# Patient Record
Sex: MALE | Race: ASIAN | NOT HISPANIC OR LATINO | URBAN - METROPOLITAN AREA
[De-identification: names, ages, dates, MRNs, and addresses within clinical notes are randomized per-mention and may not be internally consistent; named-entity substitution may affect disease eponyms.]

---

## 2018-05-08 ENCOUNTER — OFFICE (OUTPATIENT)
Dept: URBAN - METROPOLITAN AREA CLINIC 33 | Facility: CLINIC | Age: 38
End: 2018-05-08

## 2018-05-08 VITALS
TEMPERATURE: 97.7 F | DIASTOLIC BLOOD PRESSURE: 80 MMHG | SYSTOLIC BLOOD PRESSURE: 117 MMHG | WEIGHT: 181 LBS | HEIGHT: 70 IN | HEART RATE: 80 BPM

## 2018-05-08 DIAGNOSIS — R10.33 PERIUMBILICAL PAIN: ICD-10-CM

## 2018-05-08 DIAGNOSIS — K80.80 OTHER CHOLELITHIASIS WITHOUT OBSTRUCTION: ICD-10-CM

## 2018-05-08 PROCEDURE — 99205 OFFICE O/P NEW HI 60 MIN: CPT

## 2018-05-08 NOTE — INTERFACERESULTNOTES
liver enzymes still elevated (he was to have waited longer to get this lab test done). Awaiting HIDA scan results - please obtain results as soon as they are available. If his scan is scheduled for next week, have him repeat another hepatic function panel again then. Thanks

## 2018-05-08 NOTE — SERVICEHPINOTES
37 yo male (originally from [facility]) presents with recent issues of abdominal pain. He had symptoms starting in late April, shortly after eating a meal in [facility] where he works as an . He first developed nausea and felt feverish. Then developed mid-abdominal pain and some loose stools (one time). No blood in stools. He was seen at medical center there where CT suggested some inflammatory findings near pancreas and some GB wall thickening. Liver enzymes were mildly elevated (ALT in 80s) and white count was elevated. Lipase was normal and he denies ETOH consumption or cigarettes smoking or any h/o pancreatitis. He was given Augmentin and Nexium. He came back [place] the [place] and was still having some N/V and fluctuating levels of abdominal pain. Went to ER on 5/3 where U/S showed gallstones and thickening of GB wall but no overt inflammatory findings, though CT was suspicious for cholecystitis. Liver enzymes were also elevated further. Augmentin was DC'd (he had taken 3 doses) and he was given Flagyl, Cipro, Bentyl, and Zofran. An acute hepatitis panel was negative.Over the past week he has been feeling better. He can still have a little abdominal pain to right of umbilicus but denies pain with eating, N/V. His BMs have been normal.  He is needing to return to work again in [facility] as soon as he is cleared to go. He denies any h/o liver disease, jaundice. Notes that no one else got sick when/where he did, though he was told [place] medical providers at [place] facility that food-borne infections are fairly common. 5/3/18 WBC 9.3, AST//204, bili 1.1, alk phos 226, INR 1.0, Hep A IgM neg, HBcAb IgM neg, HBsAg neg, Hep C ab neg

## 2018-05-09 LAB
BASIC METABOLIC PANEL: BUN/CREATININE RATIO: (no result) (CALC)
BASIC METABOLIC PANEL: CALCIUM: 9.5 MG/DL (ref 8.6–10.3)
BASIC METABOLIC PANEL: CARBON DIOXIDE: 26 MMOL/L (ref 20–31)
BASIC METABOLIC PANEL: CHLORIDE: 103 MMOL/L (ref 98–110)
BASIC METABOLIC PANEL: CREATININE: 0.99 MG/DL (ref 0.6–1.35)
BASIC METABOLIC PANEL: EGFR AFRICAN AMERICAN: 110 ML/MIN/1.73M2 (ref 60–?)
BASIC METABOLIC PANEL: EGFR NON-AFR. AMERICAN: 95 ML/MIN/1.73M2 (ref 60–?)
BASIC METABOLIC PANEL: GLUCOSE: 85 MG/DL (ref 65–99)
BASIC METABOLIC PANEL: POTASSIUM: 4.6 MMOL/L (ref 3.5–5.3)
BASIC METABOLIC PANEL: SODIUM: 138 MMOL/L (ref 135–146)
BASIC METABOLIC PANEL: UREA NITROGEN (BUN): 10 MG/DL (ref 7–25)
HEPATIC FUNCTION PANEL: ALBUMIN/GLOBULIN RATIO: 1.6 (CALC) (ref 1–2.5)
HEPATIC FUNCTION PANEL: ALBUMIN: 4.5 G/DL (ref 3.6–5.1)
HEPATIC FUNCTION PANEL: ALKALINE PHOSPHATASE: 165 U/L — HIGH (ref 40–115)
HEPATIC FUNCTION PANEL: ALT: 201 U/L — HIGH (ref 9–46)
HEPATIC FUNCTION PANEL: AST: 51 U/L — HIGH (ref 10–40)
HEPATIC FUNCTION PANEL: BILIRUBIN, DIRECT: 0.2 MG/DL (ref ?–0.2)
HEPATIC FUNCTION PANEL: BILIRUBIN, INDIRECT: 0.5 MG/DL (CALC) (ref 0.2–1.2)
HEPATIC FUNCTION PANEL: BILIRUBIN, TOTAL: 0.7 MG/DL (ref 0.2–1.2)
HEPATIC FUNCTION PANEL: GLOBULIN: 2.8 G/DL (CALC) (ref 1.9–3.7)
HEPATIC FUNCTION PANEL: PROTEIN, TOTAL: 7.3 G/DL (ref 6.1–8.1)